# Patient Record
Sex: MALE | Race: WHITE | NOT HISPANIC OR LATINO | Employment: OTHER | URBAN - METROPOLITAN AREA
[De-identification: names, ages, dates, MRNs, and addresses within clinical notes are randomized per-mention and may not be internally consistent; named-entity substitution may affect disease eponyms.]

---

## 2022-05-07 ENCOUNTER — HOSPITAL ENCOUNTER (EMERGENCY)
Facility: CLINIC | Age: 62
Discharge: HOME | End: 2022-05-08
Attending: EMERGENCY MEDICINE | Admitting: EMERGENCY MEDICINE
Payer: COMMERCIAL

## 2022-05-07 DIAGNOSIS — L50.9 URTICARIA: ICD-10-CM

## 2022-05-07 PROCEDURE — 85027 COMPLETE CBC AUTOMATED: CPT | Performed by: EMERGENCY MEDICINE

## 2022-05-07 PROCEDURE — 250N000012 HC RX MED GY IP 250 OP 636 PS 637: Performed by: EMERGENCY MEDICINE

## 2022-05-07 PROCEDURE — 36415 COLL VENOUS BLD VENIPUNCTURE: CPT | Performed by: EMERGENCY MEDICINE

## 2022-05-07 PROCEDURE — 85007 BL SMEAR W/DIFF WBC COUNT: CPT | Performed by: EMERGENCY MEDICINE

## 2022-05-07 PROCEDURE — 99283 EMERGENCY DEPT VISIT LOW MDM: CPT

## 2022-05-07 PROCEDURE — 86618 LYME DISEASE ANTIBODY: CPT | Performed by: EMERGENCY MEDICINE

## 2022-05-07 PROCEDURE — 82310 ASSAY OF CALCIUM: CPT | Performed by: EMERGENCY MEDICINE

## 2022-05-07 RX ORDER — PREDNISONE 20 MG/1
60 TABLET ORAL ONCE
Status: COMPLETED | OUTPATIENT
Start: 2022-05-07 | End: 2022-05-07

## 2022-05-07 RX ADMIN — PREDNISONE 60 MG: 20 TABLET ORAL at 23:38

## 2022-05-07 ASSESSMENT — ENCOUNTER SYMPTOMS
COUGH: 0
NAUSEA: 0
DIZZINESS: 0
FEVER: 0

## 2022-05-08 VITALS
SYSTOLIC BLOOD PRESSURE: 118 MMHG | RESPIRATION RATE: 16 BRPM | HEIGHT: 73 IN | DIASTOLIC BLOOD PRESSURE: 80 MMHG | TEMPERATURE: 97.8 F | WEIGHT: 230 LBS | HEART RATE: 50 BPM | BODY MASS INDEX: 30.48 KG/M2 | OXYGEN SATURATION: 94 %

## 2022-05-08 LAB
ANION GAP SERPL CALCULATED.3IONS-SCNC: 6 MMOL/L (ref 3–14)
B BURGDOR IGG+IGM SER QL: 0.31
BUN SERPL-MCNC: 12 MG/DL (ref 7–30)
CALCIUM SERPL-MCNC: 8.5 MG/DL (ref 8.5–10.1)
CHLORIDE BLD-SCNC: 108 MMOL/L (ref 94–109)
CO2 SERPL-SCNC: 26 MMOL/L (ref 20–32)
CREAT SERPL-MCNC: 0.87 MG/DL (ref 0.66–1.25)
ERYTHROCYTE [DISTWIDTH] IN BLOOD BY AUTOMATED COUNT: 12.8 % (ref 10–15)
GFR SERPL CREATININE-BSD FRML MDRD: >90 ML/MIN/1.73M2
GLUCOSE BLD-MCNC: 98 MG/DL (ref 70–99)
HCT VFR BLD AUTO: 42.2 % (ref 40–53)
HGB BLD-MCNC: 13.8 G/DL (ref 13.3–17.7)
MCH RBC QN AUTO: 29.8 PG (ref 26.5–33)
MCHC RBC AUTO-ENTMCNC: 32.7 G/DL (ref 31.5–36.5)
MCV RBC AUTO: 91 FL (ref 78–100)
PLATELET # BLD AUTO: 212 10E3/UL (ref 150–450)
POTASSIUM BLD-SCNC: 4 MMOL/L (ref 3.4–5.3)
RBC # BLD AUTO: 4.63 10E6/UL (ref 4.4–5.9)
SODIUM SERPL-SCNC: 140 MMOL/L (ref 133–144)
WBC # BLD AUTO: 11.9 10E3/UL (ref 4–11)

## 2022-05-08 RX ORDER — PREDNISONE 10 MG/1
TABLET ORAL
Qty: 32 TABLET | Refills: 0 | Status: SHIPPED | OUTPATIENT
Start: 2022-05-08 | End: 2022-05-18

## 2022-05-08 NOTE — DISCHARGE INSTRUCTIONS
Follow-up with your physician in 3 days as arranged.    Return to the ER for difficulty breathing, sores or blisters around the mouth, worsening rash, or for any new concerns.

## 2022-05-08 NOTE — ED PROVIDER NOTES
"  History   Chief Complaint:  Rash      HPI   Densi Gonzalez is a 61 year old male who presents with rash which developed on Wednesday starting on his ankles bilaterally and then generalizing over the trunk and extremities. He notes walking downed a trail in the [place] on Tuesday but did not notice any tick bites and was not in contact with any plants that he thought would have caused a reaction. He used cortisone topical cream today. He denies nausea, dizziness, fever, cough, congestion, or other symptoms of a viral syndrome. He notes that he has recently been hosting event so he has been exposed to many more people than normal.  He feels that his only new exposure would be potentially some new hair shampoo.  He took benadryl at 1800.  He has not had a similar reaction in the past.    Review of Systems   Constitutional: Negative for fever.   HENT: Negative for congestion.    Respiratory: Negative for cough.    Gastrointestinal: Negative for nausea.   Skin: Positive for rash.   Neurological: Negative for dizziness.   All other systems reviewed and are negative.    Allergies:  No known drug allergies    Medications:  Lipitor     Past Medical History:     MDD  Hyperlipidemia  JOSELIN  Allergic rhinitis   chronic lymphocytic leukemia)    Family History:    Father: heart disease, hypercholesteremia, cancer  Mother: depression    Social History:  Marital status:   Presents alone     Physical Exam     Patient Vitals for the past 24 hrs:   BP Temp Temp src Pulse Resp SpO2 Height Weight   05/08/22 0106 118/80 -- -- 50 16 94 % -- --   05/07/22 2209 120/70 97.8  F (36.6  C) Temporal 60 16 97 % 1.854 m (6' 1\") 104.3 kg (230 lb)       Physical Exam  Constitutional:       General: He is not in acute distress.     Appearance: He is not diaphoretic.   HENT:      Head: Atraumatic.      Right Ear: Tympanic membrane, ear canal and external ear normal.      Left Ear: Tympanic membrane, ear canal and external ear normal.      Nose: " Nose normal.      Mouth/Throat:      Mouth: Mucous membranes are moist.      Pharynx: Oropharynx is clear. No oropharyngeal exudate or posterior oropharyngeal erythema.      Comments: No oral lesions  Eyes:      General: No scleral icterus.     Pupils: Pupils are equal, round, and reactive to light.   Neck:      Comments: No nuchal rigidity  Cardiovascular:      Rate and Rhythm: Normal rate and regular rhythm.      Heart sounds: Normal heart sounds.   Pulmonary:      Effort: No respiratory distress.      Breath sounds: Normal breath sounds.   Abdominal:      General: Bowel sounds are normal.      Palpations: Abdomen is soft.      Tenderness: There is no abdominal tenderness.   Musculoskeletal:         General: No tenderness.      Cervical back: Neck supple.   Skin:     General: Skin is warm.      Capillary Refill: Capillary refill takes less than 2 seconds.      Findings: No rash.      Comments: Erythematous papular rash most prominently over ankles bilaterally and diffusely over trunk and extremities.  No vesicles.  No drainage or pustules.   Neurological:      General: No focal deficit present.      Mental Status: He is oriented to person, place, and time.   Psychiatric:         Mood and Affect: Mood normal.         Behavior: Behavior normal.         Emergency Department Course   Laboratory:  Labs Ordered and Resulted from Time of ED Arrival to Time of ED Departure   CBC WITH PLATELETS AND DIFFERENTIAL - Abnormal       Result Value    WBC Count 11.9 (*)     RBC Count 4.63      Hemoglobin 13.8      Hematocrit 42.2      MCV 91      MCH 29.8      MCHC 32.7      RDW 12.8      Platelet Count 212     DIFFERENTIAL - Abnormal    % Neutrophils 35      % Lymphocytes 60      % Monocytes 5      % Eosinophils 0      % Basophils 0      Absolute Neutrophils 4.2      Absolute Lymphocytes 7.1 (*)     Absolute Monocytes 0.6      Absolute Eosinophils 0.0      Absolute Basophils 0.0      RBC Morphology Confirmed RBC Indices       Platelet Assessment        Value: Automated Count Confirmed. Platelet morphology is normal.    Smudge Cells Present (*)     Pathologist Review Comments       BASIC METABOLIC PANEL - Normal    Sodium 140      Potassium 4.0      Chloride 108      Carbon Dioxide (CO2) 26      Anion Gap 6      Urea Nitrogen 12      Creatinine 0.87      Calcium 8.5      Glucose 98      GFR Estimate >90     LYME DISEASE TOTAL ABS BLD WITH REFLEX TO CONFIRM CLIA        Emergency Department Course:  Reviewed:  I reviewed nursing notes, vitals, past medical history and Care Everywhere    Assessments:  2312 I obtained history and examined the patient as noted above.     Interventions:  2238 Prednisone, 60 mg, oral    Disposition:  The patient was discharged to home.     Impression & Plan   Medical Decision Making:  This patient presents for rash that began on the ankles and then has spread more generally over the trunk and extremities.  No blistering or sloughing.  No evidence of Bland-Claudio syndrome.  No oral lesions.  This appears to be erythematous papular rash.  A viral syndrome could be considered.  There is a slight lymphocytic predominance on the differential.  Contact dermatitis could be considered.  An urticarial type reaction is possible as well.  The patient's rash began after hiking in the woods but he did not notice any tick bites and I do not see any target lesions.    Lyme titer was sent and is pending.  The patient received a dose of prednisone and is overall feeling improved here in the ED although his rash is persistent although not quite as erythematous.  He is appropriate for outpatient management and will have a prednisone taper.  He is due to see his physician already in 3 days and will have a recheck.  We discussed specific return precautions.      Diagnosis:    ICD-10-CM    1. Urticaria  L50.9        Discharge Medications:  New Prescriptions    PREDNISONE (DELTASONE) 10 MG TABLET    Take 4 tablets daily for 5 days,   take 2 tablets daily for 3 days, take 1 tablet daily for 3 days, take half a tablet for 3 days.       Scribe Disclosure:  I, Geovany Beth, am serving as a scribe at 11:12 PM on 5/7/2022 to document services personally performed by Gasper Neri, based on my observations and the provider's statements to me.          Gasper Neri MD  05/08/22 0107

## 2022-05-08 NOTE — ED TRIAGE NOTES
Pt went for a walk on Tuesday and since wed he noticed rash on ankles and since then it has been growing to rest of the body.    No respiratory impact, no chest pain. Very itchy. He tried Cortizone cream at home     Triage Assessment     Row Name 05/07/22 1499       Triage Assessment (Adult)    Airway WDL WDL       Respiratory WDL    Respiratory WDL WDL       Skin Circulation/Temperature WDL    Skin Circulation/Temperature WDL --   rashes all over the skin.       Cardiac WDL    Cardiac WDL WDL       Peripheral/Neurovascular WDL    Peripheral Neurovascular WDL WDL       Cognitive/Neuro/Behavioral WDL    Cognitive/Neuro/Behavioral WDL WDL

## 2022-05-09 LAB
BASOPHILS # BLD MANUAL: 0 10E3/UL (ref 0–0.2)
BASOPHILS NFR BLD MANUAL: 0 %
EOSINOPHIL # BLD MANUAL: 0 10E3/UL (ref 0–0.7)
EOSINOPHIL NFR BLD MANUAL: 0 %
LYMPHOCYTES # BLD MANUAL: 7.1 10E3/UL (ref 0.8–5.3)
LYMPHOCYTES NFR BLD MANUAL: 60 %
MONOCYTES # BLD MANUAL: 0.6 10E3/UL (ref 0–1.3)
MONOCYTES NFR BLD MANUAL: 5 %
NEUTROPHILS # BLD MANUAL: 4.2 10E3/UL (ref 1.6–8.3)
NEUTROPHILS NFR BLD MANUAL: 35 %
PATH REV: ABNORMAL
PLAT MORPH BLD: ABNORMAL
RBC MORPH BLD: ABNORMAL
SMUDGE CELLS BLD QL SMEAR: PRESENT